# Patient Record
Sex: FEMALE | Race: BLACK OR AFRICAN AMERICAN
[De-identification: names, ages, dates, MRNs, and addresses within clinical notes are randomized per-mention and may not be internally consistent; named-entity substitution may affect disease eponyms.]

---

## 2022-12-14 ENCOUNTER — HOSPITAL ENCOUNTER (OUTPATIENT)
Dept: HOSPITAL 97 - ER | Age: 37
Setting detail: OBSERVATION
LOS: 1 days | Discharge: HOME | End: 2022-12-15
Attending: HOSPITALIST | Admitting: HOSPITALIST
Payer: COMMERCIAL

## 2022-12-14 VITALS — BODY MASS INDEX: 33.2 KG/M2

## 2022-12-14 DIAGNOSIS — Z88.0: ICD-10-CM

## 2022-12-14 DIAGNOSIS — Z20.822: ICD-10-CM

## 2022-12-14 DIAGNOSIS — G45.9: Primary | ICD-10-CM

## 2022-12-14 PROCEDURE — 80320 DRUG SCREEN QUANTALCOHOLS: CPT

## 2022-12-14 PROCEDURE — 71045 X-RAY EXAM CHEST 1 VIEW: CPT

## 2022-12-14 PROCEDURE — 80307 DRUG TEST PRSMV CHEM ANLYZR: CPT

## 2022-12-14 PROCEDURE — 83735 ASSAY OF MAGNESIUM: CPT

## 2022-12-14 PROCEDURE — 93306 TTE W/DOPPLER COMPLETE: CPT

## 2022-12-14 PROCEDURE — 84484 ASSAY OF TROPONIN QUANT: CPT

## 2022-12-14 PROCEDURE — 80076 HEPATIC FUNCTION PANEL: CPT

## 2022-12-14 PROCEDURE — 81025 URINE PREGNANCY TEST: CPT

## 2022-12-14 PROCEDURE — 81003 URINALYSIS AUTO W/O SCOPE: CPT

## 2022-12-14 PROCEDURE — 93005 ELECTROCARDIOGRAM TRACING: CPT

## 2022-12-14 PROCEDURE — 84443 ASSAY THYROID STIM HORMONE: CPT

## 2022-12-14 PROCEDURE — 36415 COLL VENOUS BLD VENIPUNCTURE: CPT

## 2022-12-14 PROCEDURE — 70498 CT ANGIOGRAPHY NECK: CPT

## 2022-12-14 PROCEDURE — 80048 BASIC METABOLIC PNL TOTAL CA: CPT

## 2022-12-14 PROCEDURE — 99285 EMERGENCY DEPT VISIT HI MDM: CPT

## 2022-12-14 PROCEDURE — 80329 ANALGESICS NON-OPIOID 1 OR 2: CPT

## 2022-12-14 PROCEDURE — 97161 PT EVAL LOW COMPLEX 20 MIN: CPT

## 2022-12-14 PROCEDURE — 70450 CT HEAD/BRAIN W/O DYE: CPT

## 2022-12-14 PROCEDURE — 87811 SARS-COV-2 COVID19 W/OPTIC: CPT

## 2022-12-14 PROCEDURE — 70496 CT ANGIOGRAPHY HEAD: CPT

## 2022-12-14 PROCEDURE — 85025 COMPLETE CBC W/AUTO DIFF WBC: CPT

## 2022-12-14 PROCEDURE — 80061 LIPID PANEL: CPT

## 2022-12-14 PROCEDURE — 83880 ASSAY OF NATRIURETIC PEPTIDE: CPT

## 2022-12-15 VITALS — DIASTOLIC BLOOD PRESSURE: 71 MMHG | SYSTOLIC BLOOD PRESSURE: 121 MMHG

## 2022-12-15 VITALS — TEMPERATURE: 98.1 F

## 2022-12-15 VITALS — OXYGEN SATURATION: 100 %

## 2022-12-15 LAB
ALBUMIN SERPL BCP-MCNC: 3.6 G/DL (ref 3.4–5)
ALP SERPL-CCNC: 80 U/L (ref 45–117)
ALT SERPL W P-5'-P-CCNC: 24 U/L (ref 13–56)
AST SERPL W P-5'-P-CCNC: 15 U/L (ref 15–37)
BUN BLD-MCNC: 10 MG/DL (ref 7–18)
GLUCOSE SERPLBLD-MCNC: 117 MG/DL (ref 74–106)
HCT VFR BLD CALC: 35.2 % (ref 36–45)
HDLC SERPL-MCNC: 42 MG/DL (ref 40–60)
LDLC SERPL CALC-MCNC: 109 MG/DL (ref ?–130)
LYMPHOCYTES # SPEC AUTO: 1.8 K/UL (ref 0.7–4.9)
MAGNESIUM SERPL-MCNC: 2.2 MG/DL (ref 1.6–2.4)
MCV RBC: 83.9 FL (ref 80–100)
METHAMPHET UR QL SCN: NEGATIVE
NT-PROBNP SERPL-MCNC: 8 PG/ML (ref ?–125)
PMV BLD: 9.2 FL (ref 7.6–11.3)
POTASSIUM SERPL-SCNC: 3.9 MMOL/L (ref 3.5–5.1)
RBC # BLD: 4.2 M/UL (ref 3.86–4.86)
SP GR UR: 1.02 (ref 1–1.03)
THC SERPL-MCNC: NEGATIVE NG/ML
TROPONIN I SERPL HS-MCNC: 4.3 PG/ML (ref ?–58.9)
TSH SERPL DL<=0.05 MIU/L-ACNC: 3.13 UIU/ML (ref 0.36–3.74)

## 2022-12-15 NOTE — P.HP
Certification for Inpatient


Patient admitted to: Observation


With expected LOS: <2 Midnights


Patient will require the following post-hospital care: None


Practitioner: I am a practitioner with admitting privileges, knowledge of 

patient current condition, hospital course, and medical plan of care.


Services: Services provided to patient in accordance with Admission requirements

found in Title 42 Section 412.3 of the Code of Federal Regulations





Patient History


Date of Service: 12/15/22


Reason for admission: CVA Rule Out


History of Present Illness: 


Patient is a 37 year old female who presented to the ED with concerns for 

stroke. She reports that she was eating dinner when all of a sudden she started 

experiencing chest tightness that radiated to her left arm, blurred vision, 

slowed speech, and heaviness in her legs. Spouse reports that her eyes were 

fluttering and she was less responsive afterwards. Patient is alert and oriented

x 4 in the ED. She reports that she still feels like her speech is slowed and is

experiencing heaviness in her legs. Her labs are unremarkable except for anemia.

All imaging is negative. She was given 324 mg aspirin in the ED. Patient denies 

any medical problems or daily medication use. ED provider wishes to admit 

patient for observation, CVA rule out.





Home medications list reviewed: Yes (NA)





- Past Medical/Surgical History


Diabetic: No


Past Medical History: Patient denies medical history


-: 


-: Cholecystectomy


Psychosocial/ Personal History: Patient lives at home with her family. She is a 

.





- Family History


  ** Father


-: Diabetes





  ** Mother


-: Diabetes





- Social History


Smoking Status: Never smoker


Alcohol use: Yes


CD- Drugs: No


Caffeine use: Yes


Place of Residence: Home





Review of Systems


Cardiovascular: Chest Pain


Neurological: Weakness, Numbness, Change in Speech





Physical Examination





- Vital Signs


Temperature: 98.1 F


Blood Pressure: 120/57


Pulse: 70


Respirations: 18


Pulse Ox (%): 99 (room air)





- Physical Exam


General: Alert, In no apparent distress


HEENT: Atraumatic, PERRLA, EOMI, Sclerae nonicteric


Neck: Supple, 2+ carotid pulse no bruit, No LAD, Without JVD or thyroid 

abnormality


Respiratory: Clear to auscultation bilaterally, Normal air movement


Cardiovascular: Regular rate/rhythm, Normal S1 S2


Gastrointestinal: Normal bowel sounds, No tenderness


Musculoskeletal: No tenderness


Integumentary: No rashes


Neurological: Normal strength at 5/5 x4 extr, Normal tone, Normal affect, 

Abnormal speech





- Studies


Laboratory Data (last 24 hrs)





12/15/22 00:35: WBC 8.20, Hgb 11.7 L, Hct 35.2 L, Plt Count 261


12/15/22 00:35: Sodium 137, Potassium 3.9, BUN 10, Creatinine 0.62, Glucose 117 

H, Magnesium 2.2, Total Bilirubin 0.2, AST 15, ALT 24, Alkaline Phosphatase 80








Assessment and Plan





- Problems (Diagnosis)


(1) TIA (transient ischemic attack)


Current Visit: Yes   Status: Acute   





- Plan


Patient is admitted for CVA rule out. 


Head CT, head/neck CT angio negative. Labs unremarkable. 


MRI stroke protocol and echo ordered for the morning as well as lipid panel and 

TSH. 


Neurology consulted. 


Symptoms could be secondary to anxiety/hyperventilation syndrome.


Aspirin, atorvastatin, folic acid daily.


 


Discharge Plan: Home


Plan to discharge in: 24 Hours





- Advance Directives


Does patient have a Living Will: No


Does patient have a Durable POA for Healthcare: No





- Code Status/Comfort Care


Code Status Assessed: Yes


Code Status: Full Code


Physician Review: Patient Assessed, Agree with Above Assessment and Plan


Critical Care: No


Time Spent Managing Pts Care (In Minutes): 50

## 2022-12-15 NOTE — ER
Nurse's Notes                                                                                     

 [facility]                                                                 

Name: Thalia Davis                                                                             

Age: 37 yrs                                                                                       

Sex: Female                                                                                       

: 1985                                                                                   

MRN: [id number]                                                                                   

Arrival Date: 2022                                                                          

Time: 23:07                                                                                       

Account#: [id number]                                                                            

Bed 5                                                                                             

Private MD:                                                                                       

Diagnosis: Weakness;Dysphasia;Paresthesia of skin                                                 

                                                                                                  

Presentation:                                                                                     

                                                                                             

23:22 Chief complaint: Spouse and/or significant other states: approx 2230 pt was eating      bb  

      dinner started feeling chest pressure radiating down left arm spouse states pt appeared     

      to have several seizures where she froze, eyes fluttered and she was unresponsive           

      afterwards she did not remember and had slow speech numbness to extremities and chin.       

      Coronavirus screen: At this time, the client does not indicate any symptoms associated      

      with coronavirus-19. Ebola Screen: No symptoms or risks identified at this time.            

      Initial Sepsis Screen: Does the patient meet any 2 criteria? No. Patient's initial          

      sepsis screen is negative. Does the patient have a suspected source of infection? No.       

      Patient's initial sepsis screen is negative. Risk Assessment: Do you want to hurt           

      yourself or someone else? Patient reports no desire to harm self or others. Onset of        

      symptoms was 2022 at 22:30.                                                    

23:22 Method Of Arrival: Ambulatory                                                           bb  

23:22 Acuity: GUILLAUME 2                                                                           bb  

                                                                                                  

OB/GYN:                                                                                           

23:25 LMP 2022                                                                           bb  

                                                                                                  

Historical:                                                                                       

- Allergies:                                                                                      

23:25 PENICILLINS;                                                                            bb  

- Home Meds:                                                                                      

23:25 None [Active];                                                                          bb  

- PMHx:                                                                                           

23:25 None;                                                                                   bb  

- PSHx:                                                                                           

23:25  section; Cholecystectomy;                                                      bb  

                                                                                                  

- Immunization history:: Client reports having NOT received the Covid vaccine.                    

- Social history:: Smoking status: Patient denies any tobacco usage or history of.                

                                                                                                  

                                                                                                  

Screenin/15                                                                                             

00:22 Abuse screen: Denies threats or abuse. Denies injuries from another. Nutritional        ha1 

      screening: No deficits noted. Tuberculosis screening: No symptoms or risk factors           

      identified.                                                                                 

06:44 [facility] ED Fall Risk Assessment (Adult) History of falling in the last 3 months,       ha1 

      including since admission No falls in past 3 months (0 pts) Confusion or Disorientation     

      No (0 pts) Intoxicated or Sedated No (0 pts) Impaired Gait No (0 pts) Mobility Assist       

      Device Used No (0 pt) Altered Elimination No (0 pt) Score/Fall Risk Level 0 - 2 = Low       

      Risk Oriented to surroundings, Maintained a safe environment, Educated pt \T\ family on     

      fall prevention, incl call for assistance when getting out of bed, Assessed \T\             

      reinforced patient's understanding of fall precautions, Hourly rounding (assess needs \T\   

      fall precautionary measures) done.                                                          

06:45 Fall Risk No fall in past 12 months (0 pts). IV access (20 points). Gait- Normal/Bed    ha1 

      Rest/Wheelchair (0 pts) Mental Status- Oriented to own ability (0 pts). Total Reis         

      Fall Scale indicates No Risk (0-24 pts).                                                    

                                                                                                  

Assessment:                                                                                       

00:19 General: Appears comfortable, Behavior is calm, cooperative. Pain: Denies pain. Neuro:  ha1 

      Level of Consciousness is awake, alert, obeys commands, Oriented to person, place,          

      time, situation. Cardiovascular: Heart tones S1 S2 present Capillary refill < 3 seconds     

      Patient's skin is warm and dry. Respiratory: Airway is patent Trachea deviated to right     

      Respiratory effort is even, unlabored, Respiratory pattern is regular, symmetrical. GI:     

      Abdomen is non-distended, obese, Bowel sounds present X 4 quads. : No signs and/or        

      symptoms were reported regarding the genitourinary system. EENT: No deficits noted. No      

      signs and/or symptoms were reported regarding the EENT system. Derm: Skin is normal.        

      Musculoskeletal: Circulation, motion, and sensation intact. Range of motion: intact in      

      all extremities, Reports numbness in right leg and left leg. Musculoskeletal: Reports       

      numbness in right arm, right leg and left leg.                                              

01:20 Reassessment: Patient and/or family updated on plan of care and expected duration. Pain ha1 

      level reassessed. Patient is alert, oriented x 3, equal unlabored respirations, skin        

      warm/dry/pink. Patient denies pain at this time.                                            

02:20 Reassessment: Patient and/or family updated on plan of care and expected duration. Pain ha1 

      level reassessed. Patient is alert, oriented x 3, equal unlabored respirations, skin        

      warm/dry/pink. Patient denies pain at this time.                                            

03:20 Reassessment: Patient and/or family updated on plan of care and expected duration. Pain ha1 

      level reassessed. Patient is alert, oriented x 3, equal unlabored respirations, skin        

      warm/dry/pink. Patient denies pain at this time.                                            

04:20 Reassessment: Patient and/or family updated on plan of care and expected duration. Pain ha1 

      level reassessed. Patient is alert, oriented x 3, equal unlabored respirations, skin        

      warm/dry/pink. Patient denies pain at this time.                                            

07:00 Reassessment: RECD REPORT FROM MATTHEW SAUCEDO. 38YO BF P/W CIRCUMORAL NUMBNESS AND SLOW       bp  

      SPEECH AFTER CONSPICUOUS CONSUMPTION OF ALCOHOL AT [employer] [employer]. ADMIT IN PROCESS.        

                                                                                                  

Vital Signs:                                                                                      

                                                                                             

23:22  / 85; Pulse 86; Resp 16 S; Temp 98.1(O); Pulse Ox 100% on R/A; Weight 96.62 kg   bb  

      (R); Height 5 ft. 4 in. (162.56 cm) (R); Pain 0/10;                                         

23:36  / 82; Pulse 85; Resp 18 S; Pulse Ox 100% on R/A;                                 ha1 

12/15                                                                                             

00:30  / 60; Pulse 82; Resp 16 S; Pulse Ox 100% on R/A;                                 ha1 

01:30  / 57; Pulse 71; Resp 18 S; Pulse Ox 99% on R/A;                                  ha1 

02:20  / 57; Pulse 70; Resp 18 S; Pulse Ox 99% on R/A;                                  ha1 

03:20  / 73; Pulse 72; Resp 17 S; Pulse Ox 100% on R/A;                                 ha1 

04:20  / 54; Pulse 65; Resp 16 S; Pulse Ox 100% on R/A;                                 ha1 

                                                                                             

23:22 Body Mass Index 36.56 (96.62 kg, 162.56 cm)                                               

                                                                                                  

ED Course:                                                                                        

                                                                                             

23:07 Patient arrived in ED.                                                                  es  

23:25 Triage completed.                                                                       bb  

23:25 Arm band placed on Patient placed in an exam room, on a stretcher, on pulse oximetry.   bb  

      Family accompanied patient.                                                                 

23:45 Patient has correct armband on for positive identification. Bed in low position. Call   ha1 

      light in reach. Side rails up X 1. Adult w/ patient.                                        

23:51 Adria Barrera MD is Attending Physician.                                              kdr 

23:55 Inserted saline lock: 20 gauge in left antecubital area, using aseptic technique.       ha1 

12/15                                                                                             

00:46 CT Head Brain wo Cont In Process Unspecified.                                           EDMS

01:25 XRAY Chest (1 view) In Process Unspecified.                                             EDMS

02:25 CT Head Angio In Process Unspecified.                                                   EDMS

02:25 Neck Angio In Process Unspecified.                                                      EDMS

02:40 Matthew Pineda, RN is Primary Nurse.                                                      ha1 

04:04 Laron Marie MD is Hospitalizing Provider.                                           kdr 

06:41 No provider procedures requiring assistance completed. Patient admitted, IV remains in  ha1 

      place.                                                                                      

06:46 SARS RAPID Sent.                                                                        ha1 

07:13 Primary Nurse role handed off by Matthew Pineda, RN                                       bp  

07:13 Nura Delarosa, RN is Primary Nurse.                                                    bp  

                                                                                                  

Administered Medications:                                                                         

04:58 Drug: Aspirin 325 mg Route: PO;                                                         ha1 

06:46 Follow up: Response: No adverse reaction                                                ha1 

                                                                                                  

                                                                                                  

Medication:                                                                                       

05:13 VIS not applicable for this client.                                                     ha1 

                                                                                                  

Outcome:                                                                                          

04:06 Decision to Hospitalize by Provider.                                                    kdr 

06:42 Admitted to ER Hold.  Please see [facility] for further documentation.                    ha1 

06:42 Condition: stable                                                                           

06:42 Instructed on the need for admit.                                                           

09:59 Patient left the ED.                                                                    bp  

                                                                                                  

Signatures:                                                                                       

Dispatcher [employer]                           Adria Mancini MD MD kdr Salyer, Edna es Ballard, Brenda, RN                     RN   bb                                                   

Nura Delarosa, SHERYL                      RN   bp                                                   

Matthew Pineda, SHERYL                        RN   ha1                                                  

                                                                                                  

**************************************************************************************************

## 2022-12-15 NOTE — RAD REPORT
EXAM DESCRIPTION:  CT - Head angio - 12/15/2022 6:35 am

 

CLINICAL HISTORY:  37 years, Female, Head trauma, abnormal mental status

 

COMPARISON:  None.

 

TECHNIQUE:  Axial CTA images of the head and neck obtained following the uncomplicated intravenous ad
ministration of iodinated contrast. 3-D/MIP reformatted images available. This exam was performed acc
ording to our departmental dose-optimization program, which includes automated exposure control, adju
stment of the mA and/or kV according to patient size and/or use of iterative reconstruction technique
.

 

FINDINGS:  CTA head:

In the anterior circulation, the intracranial internal carotid arteries have normal course. The inter
nal carotid arteries bifurcate into patent A1 and M1 segments of the anterior and middle cerebral art
eries respectively. No evidence of flow-limiting stenosis, aneurysm, occlusion, or dissection in the 
anterior circulation. The anterior communicating artery is patent.

In the posterior circulation, the intracranial vertebral arteries combine to form a patent basilar ar
paula. The basilar artery bifurcates into patent P1 segments of the posterior cerebral artery. No evid
ence of stenosis, aneurysm, occlusion, or dissection in the posterior circulation.

No definite acute intracranial abnormality identified. No acute abnormality of the osseous calvarium.
 Paranasal sinuses and mastoid air cells are well aerated.

CTA NECK:

The aortic arch has normal anatomic configuration. The origin of the great vessels are patent.

The right common carotid artery is patent and bifurcates into patent internal and external carotid ar
teries. 0% stenosis by NASCET criteria. No evidence of occlusion or dissection.

The left common carotid artery is patent and bifurcates into patent internal and external carotid art
eries. 0% stenosis by NASCET criteria. No evidence of occlusion or dissection.

The cervical vertebral arteries are patent throughout their course. No evidence of occlusion, stenosi
s, or dissection.

No definite acute abnormalities in the neck soft tissues. No apical pneumothorax. No acute osseous ab
normalities.

 

IMPRESSION:  1.   No evidence of stenosis, occlusion, or aneurysm in the intracranial arterial circul
ation.

2.   No evidence of stenosis/occlusion involving the cervical carotid or vertebral arteries.

 

Electronically signed by:   Sarath Proctor   12/15/2022 2:47 AM CST Workstation: [id number]

 

 

 

Due to temporary technical issues with the PACS/Fluency reporting system, reports are being signed by
 the in house radiologists without review as a courtesy to insure prompt reporting. The interpreting 
radiologist is fully responsible for the content of the report.

## 2022-12-15 NOTE — RAD REPORT
EXAM DESCRIPTION:  CT - Head Brain Wo Cont - 12/15/2022 6:33 am

 

CLINICAL HISTORY:  The patient is 37 years old and is Female; Mental status change, unknown cause

 

TECHNIQUE:  Axial computed tomography images of the head/brain without intravenous contrast.   Sagitt
al and coronal reformatted images were created and reviewed.   This CT exam was performed using one o
r more of the following dose reduction techniques:   automated exposure control, adjustment of the mA
 and/or kV according to patient size, and/or use of iterative reconstruction technique.

 

COMPARISON:  No relevant prior studies available.

 

FINDINGS:  BRAIN:    No extra-axial fluid collection.

         No intracranial hemorrhage.

         No transtentorial herniation.

         No focal gray-white matter differentiation abnormality.

MIDLINE SHIFT:    No midline shift.

VENTRICLES:    Unremarkable.   No ventriculomegaly.

BONES/JOINTS:    No fracture of the calvarium or visualized facial bones.

SOFT TISSUES:    Unremarkable.

SINUSES:    Unremarkable as visualized.   No acute sinusitis.

MASTOID AIR CELLS:    Unremarkable as visualized.   No mastoid effusion.

 

IMPRESSION:  No acute intracranial abnormality.

 

Electronically signed by:   Miguel Ángel Morales MD   12/15/2022 12:51 AM CST Workstation: [id number]

 

 

Due to temporary technical issues with the PACS/Fluency reporting system, reports are being signed by
 the in house radiologists without review as a courtesy to insure prompt reporting. The interpreting 
radiologist is fully responsible for the content of the report.

## 2022-12-15 NOTE — RAD REPORT
EXAM DESCRIPTION:  CT - Neck Angio - 12/15/2022 6:36 am

 

CLINICAL HISTORY:  37 years, Female, Head trauma, abnormal mental status

 

COMPARISON:  None.

 

TECHNIQUE:  Axial CTA images of the head and neck obtained following the uncomplicated intravenous ad
ministration of iodinated contrast. 3-D/MIP reformatted images available. This exam was performed acc
ording to our departmental dose-optimization program, which includes automated exposure control, adju
stment of the mA and/or kV according to patient size and/or use of iterative reconstruction technique
.

 

FINDINGS:  CTA head:

In the anterior circulation, the intracranial internal carotid arteries have normal course. The inter
nal carotid arteries bifurcate into patent A1 and M1 segments of the anterior and middle cerebral art
eries respectively. No evidence of flow-limiting stenosis, aneurysm, occlusion, or dissection in the 
anterior circulation. The anterior communicating artery is patent.

In the posterior circulation, the intracranial vertebral arteries combine to form a patent basilar ar
paula. The basilar artery bifurcates into patent P1 segments of the posterior cerebral artery. No evid
ence of stenosis, aneurysm, occlusion, or dissection in the posterior circulation.

No definite acute intracranial abnormality identified. No acute abnormality of the osseous calvarium.
 Paranasal sinuses and mastoid air cells are well aerated.

CTA NECK:

The aortic arch has normal anatomic configuration. The origin of the great vessels are patent.

The right common carotid artery is patent and bifurcates into patent internal and external carotid ar
teries. 0% stenosis by NASCET criteria. No evidence of occlusion or dissection.

The left common carotid artery is patent and bifurcates into patent internal and external carotid art
eries. 0% stenosis by NASCET criteria. No evidence of occlusion or dissection.

The cervical vertebral arteries are patent throughout their course. No evidence of occlusion, stenosi
s, or dissection.

No definite acute abnormalities in the neck soft tissues. No apical pneumothorax. No acute osseous ab
normalities.

 

IMPRESSION:  1.   No evidence of stenosis, occlusion, or aneurysm in the intracranial arterial circul
ation.

2.   No evidence of stenosis/occlusion involving the cervical carotid or vertebral arteries.

 

Electronically signed by:   Sarath Proctor   12/15/2022 2:47 AM CST Workstation: [id number]

 

 

Due to temporary technical issues with the PACS/Fluency reporting system, reports are being signed by
 the in house radiologists without review as a courtesy to insure prompt reporting. The interpreting 
radiologist is fully responsible for the content of the report.

## 2022-12-15 NOTE — EKG
Test Date:    2022-12-14               Test Time:    23:43:28

Technician:   SHIKHA                                    

                                                     

MEASUREMENT RESULTS:                                       

Intervals:                                           

Rate:         77                                     

DE:           156                                    

QRSD:         80                                     

QT:           394                                    

QTc:          445                                    

Axis:                                                

P:            46                                     

DE:           156                                    

QRS:          14                                     

T:            19                                     

                                                     

INTERPRETIVE STATEMENTS:                                       

                                                     

Normal sinus rhythm

Cannot rule out Anterior infarct, age undetermined

Abnormal ECG

No previous ECG available for comparison



Electronically Signed On 12-15-22 08:01:35 CST by Meño Gaona

## 2022-12-15 NOTE — RAD REPORT
EXAM DESCRIPTION:  RAD - Chest Single View - 12/15/2022 1:23 am

 

CLINICAL HISTORY:  The patient is 37 years old and is Female; [place]

 

TECHNIQUE:  Frontal view of the chest.

 

COMPARISON:  No relevant prior studies available.

 

FINDINGS:  LUNGS:   Unremarkable.   No consolidation.

   PLEURAL SPACE:   Unremarkable.   No pneumothorax.

   HEART:   Unremarkable.   No cardiomegaly.

   MEDIASTINUM:   Unremarkable.

   BONES/JOINTS:   Unremarkable.

   UPPER ABDOMEN:   Unremarkable as visualized.

 

IMPRESSION:  No acute cardiopulmonary process.

 

Electronically signed by:   Ruht Ornelas MD   12/15/2022 1:34 AM CST Workstation: [id number]

 

 

Due to temporary technical issues with the PACS/Fluency reporting system, reports are being signed by
 the in house radiologists without review as a courtesy to insure prompt reporting. The interpreting 
radiologist is fully responsible for the content of the report.

## 2022-12-15 NOTE — EDPHYS
Physician Documentation                                                                           

 [facility]                                                                 

Name: Thalia Davis                                                                             

Age: 37 yrs                                                                                       

Sex: Female                                                                                       

: 1985                                                                                   

MRN: [id number]                                                                                   

Arrival Date: 2022                                                                          

Time: 23:07                                                                                       

Account#: [id number]                                                                            

Bed 5                                                                                             

Private MD:                                                                                       

ED Physician Adria Barrera                                                                       

HPI:                                                                                              

12/15                                                                                             

07:27 This 37 yrs old Black Female presents to ER via Ambulatory with complaints of NUMBNESS, kdr 

      SLOW SPEACH, FEEL HEAVY.                                                                    

07:27 Patient was stating that approximately 20-30 she was eating dinner started feel chest   kdr 

      pressure and pain radiating down her left arm. Her spouse states that she appeared to       

      have a seizure where she froze and her eyes flutter briefly. She then became                

      unresponsive afterwards. She did not remember any of these incidents and subsequently       

      had slowed speech. She also complained of numbness that was perioral and in both upper      

      extremities and lower extremities.. Onset: The symptoms/episode began/occurred at           

      22:30. Severity of symptoms: At their worst the symptoms were mild moderate just prior      

      to arrival, in the emergency department the symptoms are unchanged. The patient has not     

      experienced similar symptoms in the past. The patient has not recently seen a physician.    

                                                                                                  

OB/GYN:                                                                                           

                                                                                             

23:25 LMP 2022                                                                           bb  

                                                                                                  

Historical:                                                                                       

- Allergies:                                                                                      

23:25 PENICILLINS;                                                                            bb  

- Home Meds:                                                                                      

23:25 None [Active];                                                                          bb  

- PMHx:                                                                                           

23:25 None;                                                                                   bb  

- PSHx:                                                                                           

23:25  section; Cholecystectomy;                                                      bb  

                                                                                                  

- Immunization history:: Client reports having NOT received the Covid vaccine.                    

- Social history:: Smoking status: Patient denies any tobacco usage or history of.                

                                                                                                  

                                                                                                  

ROS:                                                                                              

12/15                                                                                             

07:27 Constitutional: Negative for fever, chills, and weight loss, Eyes: Negative for injury, kdr 

      pain, redness, and discharge, ENT: Negative for injury, pain, and discharge, Neck:          

      Negative for injury, pain, and swelling, Cardiovascular: Negative for chest pain,           

      palpitations, and edema, Respiratory: Negative for shortness of breath, cough,              

      wheezing, and pleuritic chest pain, Abdomen/GI: Negative for abdominal pain, nausea,        

      vomiting, diarrhea, and constipation, Back: Negative for injury and pain, : Negative      

      for injury, bleeding, discharge, and swelling, MS/Extremity: Negative for injury and        

      deformity, Skin: Negative for injury, rash, and discoloration, Psych: Negative for          

      depression, anxiety, suicide ideation, homicidal ideation, and hallucinations,              

      Allergy/Immunology: Negative for hives, rash, and allergies, Endocrine: Negative for        

      neck swelling, polydipsia, polyuria, polyphagia, and marked weight changes,                 

      Hematologic/Lymphatic: Negative for swollen nodes, abnormal bleeding, and unusual           

      bruising.                                                                                   

      Neuro: Positive for altered mental status, speech changes, weakness.                        

                                                                                                  

Exam:                                                                                             

07:27 Constitutional:  This is a well developed, well nourished patient who is awake, alert,  kdr 

      and in no acute distress. Head/Face:  Normocephalic, atraumatic. Eyes:  Pupils equal        

      round and reactive to light, extra-ocular motions intact.  Lids and lashes normal.          

      Conjunctiva and sclera are non-icteric and not injected.  Cornea within normal limits.      

      Periorbital areas with no swelling, redness, or edema. Neck:  Trachea midline, no           

      thyromegaly or masses palpated, and no cervical lymphadenopathy.  Supple, full range of     

      motion without nuchal rigidity, or vertebral point tenderness.  No Meningismus.             

      Chest/axilla:  Normal chest wall appearance and motion.  Nontender with no deformity.       

      No lesions are appreciated. Cardiovascular:  Regular rate and rhythm with a normal S1       

      and S2.  No gallops, murmurs, or rubs.  Normal PMI, no JVD.  No pulse deficits.             

      Respiratory:  Lungs have equal breath sounds bilaterally, clear to auscultation and         

      percussion.  No rales, rhonchi or wheezes noted.  No increased work of breathing, no        

      retractions or nasal flaring. Abdomen/GI:  Soft, non-tender, with normal bowel sounds.      

      No distension or tympany.  No guarding or rebound.  No evidence of tenderness               

      throughout. Back:  No spinal tenderness.  No costovertebral tenderness.  Full range of      

      motion. Skin:  Warm, dry with normal turgor.  Normal color with no rashes, no lesions,      

      and no evidence of cellulitis. MS/ Extremity:  Pulses equal, no cyanosis.                   

      Neurovascular intact.  Full, normal range of motion. Psych:  Awake, alert, with             

      orientation to person, place and time.  Behavior, mood, and affect are within normal        

      limits.                                                                                     

07:27 Neuro: Orientation: is normal, Mentation: able to follow commands, slow to respond,         

      Memory: unable to test, Cranial nerves: no acute changes, Speech is dysarthric, slowed.     

                                                                                                  

Vital Signs:                                                                                      

                                                                                             

23:22  / 85; Pulse 86; Resp 16 S; Temp 98.1(O); Pulse Ox 100% on R/A; Weight 96.62 kg   bb  

      (R); Height 5 ft. 4 in. (162.56 cm) (R); Pain 0/10;                                         

23:36  / 82; Pulse 85; Resp 18 S; Pulse Ox 100% on R/A;                                 ha1 

12/15                                                                                             

00:30  / 60; Pulse 82; Resp 16 S; Pulse Ox 100% on R/A;                                 ha1 

01:30  / 57; Pulse 71; Resp 18 S; Pulse Ox 99% on R/A;                                  ha1 

02:20  / 57; Pulse 70; Resp 18 S; Pulse Ox 99% on R/A;                                  ha1 

03:20  / 73; Pulse 72; Resp 17 S; Pulse Ox 100% on R/A;                                 ha1 

04:20  / 54; Pulse 65; Resp 16 S; Pulse Ox 100% on R/A;                                 ha1 

                                                                                             

23:22 Body Mass Index 36.56 (96.62 kg, 162.56 cm)                                             bb  

                                                                                                  

MDM:                                                                                              

04:06 Patient medically screened.                                                             kdr 

07:27 Data reviewed: vital signs, nurses notes, lab test result(s), radiologic studies.       kdr 

      Counseling: I had a detailed discussion with the patient and/or guardian regarding: the     

      historical points, exam findings, and any diagnostic results supporting the                 

      discharge/admit diagnosis, lab results, radiology results, the need for further work-up     

      and treatment in the hospital.                                                              

                                                                                                  

                                                                                             

23:53 Order name: Basic Metabolic Panel; Complete Time: 03:12                                 kdr 

                                                                                             

23:53 Order name: CBC with Diff; Complete Time: 03:12                                         kdr 

                                                                                             

23:53 Order name: LFT's; Complete Time: 03:12                                                 kdr 

                                                                                             

23:53 Order name: Magnesium; Complete Time: 03:12                                             kdr 

                                                                                             

23:53 Order name: NT PRO-BNP; Complete Time: 03:12                                            kdr 

                                                                                             

23:53 Order name: Troponin HS; Complete Time: 03:12                                           kdr 

                                                                                             

23:53 Order name: Acetaminophen; Complete Time: 03:12                                         kdr 

                                                                                             

23:53 Order name: ETOH Level; Complete Time: 03:12                                            kdr 

                                                                                             

23:53 Order name: Salicylate; Complete Time: 03:12                                            kdr 

                                                                                             

23:53 Order name: Urine Drug Screen; Complete Time: 04:04                                     kdr 

12/15                                                                                             

04:06 Order name: Urine Dipstick-Ancillary                                                    EDMS

12/15                                                                                             

04:10 Order name: Urine Pregnancy--Ancillary (enter results)                                  wm  

12/15                                                                                             

04:35 Order name: SARS RAPID                                                                  ha1 

12/15                                                                                             

05:31 Order name: SARS-COV-2 Antigen Rapid                                                    EDMS

                                                                                             

23:53 Order name: XRAY Chest (1 view)                                                         kdr 

                                                                                             

23:53 Order name: EKG; Complete Time: 23:54                                                   kdr 

                                                                                             

23:53 Order name: Cardiac monitoring; Complete Time: 00:24                                    kdr 

                                                                                             

23:53 Order name: EKG - Nurse/Tech; Complete Time: 00:24                                      kdr 

                                                                                             

23:53 Order name: IV Saline Lock; Complete Time: 00:25                                        kdr 

                                                                                             

23:53 Order name: Labs collected and sent; Complete Time: 04:36                               kdr 

                                                                                             

23:53 Order name: O2 Per Protocol; Complete Time: 00:25                                       kdr 

                                                                                             

23:53 Order name: O2 Sat Monitoring; Complete Time: 00:25                                     kdr 

                                                                                             

23:53 Order name: Urine Dipstick-Ancillary (obtain specimen); Complete Time: 04:35            kdr 

                                                                                             

23:53 Order name: CT Head Brain wo Cont                                                       kdr 

12/15                                                                                             

00:29 Order name: CT Head Angio                                                               kdr 

12/15                                                                                             

01:14 Order name: Neck Angio                                                                  EDMS

12/15                                                                                             

08:38 Order name: Lipid Profile                                                               EDMS

12/15                                                                                             

08:38 Order name: Thyroid Stimulating Hormone                                                 EDMS

                                                                                                  

Administered Medications:                                                                         

04:58 Drug: Aspirin 325 mg Route: PO;                                                         ha1 

06:46 Follow up: Response: No adverse reaction                                                ha1 

                                                                                                  

                                                                                                  

Disposition Summary:                                                                              

12/15/22 04:06                                                                                    

Hospitalization Ordered                                                                           

      Hospitalization Status: Observation                                                     kdr 

      Provider: Laron Marie                                                                kdr 

      Condition: Fair                                                                         kdr 

      Problem: new                                                                            kdr 

      Symptoms: have improved                                                                 kdr 

      Bed/Room Type: Standard                                                                 kdr 

      Location: [facility] ER HOLD(12/15/22 04:43)                                                  eb1 

      Room Assignment: ERHOLD-(12/15/22 04:43)                                                eb1 

      Diagnosis                                                                                   

        - Weakness                                                                            kdr 

        - Dysphasia                                                                           kdr 

        - Paresthesia of skin                                                                 kdr 

      Discharge Instructions:                                                                     

        - Discharge Summary Sheet                                                             em1 

      Forms:                                                                                      

        - Medication Reconciliation Form                                                      kdr 

        - SBAR form                                                                           kdr 

        - Work release form                                                                   em1 

Signatures:                                                                                       

Dispatcher [employer]                           Adria Mancini MD MD kdr Ballard, Brenda, RN                     RN   Natalie Orellana RN                     RN   eb1                                                  

Vijaya Pineda RN                        RN   Rupal Camarena PA-C PA-C sb4                                                  

                                                                                                  

Corrections: (The following items were deleted from the chart)                                    

04:43 04:06 Telemetry/MedSurg (observation) kdr                                               eb1 

04:43 04:06 kdr                                                                               eb1 

                                                                                                  

**************************************************************************************************

## 2022-12-16 NOTE — ECHO
HEIGHT: 5 ft 7 in   WEIGHT: 212 lb 0 oz   DATE OF STUDY: 12/15/2022   REFER DR: Rupal Thompson

2-DIMENSIONAL: YES

     M.MODE: YES

 DOPPLER: YES

COLOR FLOW: YES



                    TDS:  

PORTABLE: YES

 DEFINITY:  

BUBBLE STUDY: 





DIAGNOSIS:  DIZZINESS



CARDIAC HISTORY:  

CATHERIZATION: 

SURGERY: 

PROSTHETIC VALVE: 

PACEMAKER: 





MEASUREMENTS (cm)

    DIASTOLIC (NORMALS)                 SYSTOLIC (NORMALS)

IVSd                 1.1 (0.6-1.2)                    LA Diam 3.1 (1.9-4.0)     LVEF       
  60%  

LVIDd               3.3 (3.5-5.7)                        LVIDs      2.5 (2.0-3.5)     %FS  
        26%

LVPWd             1.1 (0.6-1.2)

Ao Diam           2.6 (2.0-3.7)



2 DIMENSIONAL ASSESSMENT:

RIGHT ATRIUM:                   NORMAL

LEFT ATRIUM:       NORMAL



RIGHT VENTRICLE:            NORMAL

LEFT VENTRICLE: NORMAL



TRICUSPID VALVE:  TRACE TRICUSPID REGURGITATION

MITRAL VALVE:     TRACE MITRAL REGURGITATION



PULMONIC VALVE:             NORMAL

AORTIC VALVE:     NORMAL



PERICARDIAL EFFUSION: NONE

AORTIC ROOT:      NORMAL





LEFT VENTRICULAR WALL MOTION:     NORMAL



DOPPLER/COLOR FLOW:     TRACE MITRAL REGURGITATION, TRICUSPID REGURGITATION



COMMENTS:      

  1. NORMAL LEFT VENTRICUALR EJECTION FRACTION 60-65% WITH NORMAL WALL MOTION

  2. NORMAL DIASTOLIC FUNCTION

  3. TRACE MITRAL REGURGITATION

  4. TRACE TRICUSPID REGURGITATION



TECHNOLOGIST:   MOSHE ENCARNACION

## 2022-12-27 NOTE — P.DS
Discharge Date: 12/15/22


Disposition: ROUTINE DISCHARGE


Discharge Condition: GOOD


Reason for Admission: CVA Rule Out


Brief History of Present Illness: 





Patient is a 37 year old female who presented to the ED with concerns for 

stroke. She reports that she was eating dinner when all of a sudden she started 

experiencing chest tightness that radiated to her left arm, blurred vision, 

slowed speech, and heaviness in her legs. Spouse reports that her eyes were flu

ttering and she was less responsive afterwards. Patient is alert and oriented x 

4 in the ED. She reports that she still feels like her speech is slowed and is 

experiencing heaviness in her legs. Her labs are unremarkable except for anemia.

All imaging is negative. She was given 324 mg aspirin in the ED. Patient denies 

any medical problems or daily medication use. ED provider wishes to admit patien

t for observation, CVA rule out.


Hospital Course: 





  Patient is clinically doing much better.  Symptoms have resolved.  I spoke to 

the patient and she was scheduled to get an MRI by nursing staff at discharge 

patient prior to his being done.  Patient feels well.  I called patient at home 

in her clinical symptoms have pretty much resolved.  Patient will follow-up with

PCP for further evaluation as needed.  Continue anti-platelet therapy and 

antibiotic therapy with outpatient Neurology follow-up as well.


Vital Signs/Physical Exam: 














Temp Pulse Resp BP Pulse Ox


 


 98.1 F   74   15   121/71   98 


 


 12/15/22 04:33  12/15/22 08:00  12/15/22 08:00  12/15/22 08:00  12/15/22 08:00








General: Alert, In no apparent distress, Oriented x3


Laboratory Data at Discharge: 














WBC  8.20 K/uL (4.3-10.9)   12/15/22  00:35    


 


Hgb  11.7 g/dL (12.0-15.0)  L  12/15/22  00:35    


 


Hct  35.2 % (36.0-45.0)  L  12/15/22  00:35    


 


Plt Count  261 K/uL (152-406)   12/15/22  00:35    


 


Sodium  137 mmol/L (136-145)   12/15/22  00:35    


 


Potassium  3.9 mmol/L (3.5-5.1)   12/15/22  00:35    


 


BUN  10 mg/dL (7-18)   12/15/22  00:35    


 


Creatinine  0.62 mg/dL (0.55-1.02)   12/15/22  00:35    


 


Glucose  117 mg/dL ()  H  12/15/22  00:35    


 


Magnesium  2.2 mg/dL (1.6-2.4)   12/15/22  00:35    


 


Total Bilirubin  0.2 mg/dL (0.2-1.0)   12/15/22  00:35    


 


AST  15 U/L (15-37)   12/15/22  00:35    


 


ALT  24 U/L (13-56)   12/15/22  00:35    


 


Alkaline Phosphatase  80 U/L ()   12/15/22  00:35    


 


Triglycerides  80 mg/dL (<150)   12/15/22  08:00    


 


Cholesterol  167 mg/dL (<200)   12/15/22  08:00    


 


HDL Cholesterol  42 mg/dL (40-60)   12/15/22  08:00    


 


Cholesterol/HDL Ratio  3.98   12/15/22  08:00    








Home Medications: 








Aspirin [Aspirin EC 81 MG] 81 mg PO DAILY #30 tab 12/15/22 


Cefdinir [Cefdinir*] 300 mg PO BID #14 cap 12/15/22 





New Medications: 


Aspirin [Aspirin EC 81 MG] 81 mg PO DAILY #30 tab


Cefdinir [Cefdinir*] 300 mg PO BID #14 cap


Physician Discharge Instructions: 


-DC IV and DC home


-Follow-up with PCP in 1 to 2 weeks


-Follow-up with Neurology in 1 to 2 weeks


-Please call Dr. Marie at [phone] if any questions regarding hospital stay


-Please call nursing station at [phone] if any nursing or medication 

questions


-Return to the emergency room if symptoms worsen


Diet: AHA


Activity: Fall precautions


Followup: 


Trace GATES,Scarlett CHONG DO [Primary Care Provider] - 


Time spent managing pt's care (in minutes): 35